# Patient Record
Sex: FEMALE
[De-identification: names, ages, dates, MRNs, and addresses within clinical notes are randomized per-mention and may not be internally consistent; named-entity substitution may affect disease eponyms.]

---

## 2018-09-20 ENCOUNTER — HOSPITAL ENCOUNTER (OUTPATIENT)
Dept: HOSPITAL 89 - SPU | Age: 28
LOS: 41 days | Discharge: HOME | End: 2018-10-31
Attending: INTERNAL MEDICINE
Payer: MEDICAID

## 2018-09-20 VITALS — DIASTOLIC BLOOD PRESSURE: 67 MMHG | SYSTOLIC BLOOD PRESSURE: 110 MMHG

## 2018-09-20 VITALS — WEIGHT: 167.11 LBS | BODY MASS INDEX: 28.29 KG/M2

## 2018-09-20 DIAGNOSIS — D69.6: Primary | ICD-10-CM

## 2018-09-20 DIAGNOSIS — R53.1: ICD-10-CM

## 2018-09-20 DIAGNOSIS — R53.83: ICD-10-CM

## 2018-09-20 DIAGNOSIS — Z87.891: ICD-10-CM

## 2018-09-20 LAB — PLATELET COUNT, AUTOMATED: 185 K/UL (ref 150–450)

## 2018-09-20 PROCEDURE — 36415 COLL VENOUS BLD VENIPUNCTURE: CPT

## 2018-09-20 PROCEDURE — 85025 COMPLETE CBC W/AUTO DIFF WBC: CPT

## 2018-09-20 PROCEDURE — 99212 OFFICE O/P EST SF 10 MIN: CPT

## 2018-09-20 NOTE — ONCOLOGY FOLLOW UP NOTE
EVENT DATE:  September 20, 2018 



DIAGNOSIS

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 

thrombocytopenic purpura.



CHIEF COMPLAINT

Patient is here today for followup of her thrombocytopenia with pregnancy.



HEMATOLOGY HISTORY

Patient is a 28-year-old female.  Patient moved from [place] to [place], [place].  She got pregnant, and [facility] 

have blood count on September 29, 2017, which showed normal white count of 5440,

normal hemoglobin at 14.6, and hematocrit at 41.7%, but her platelet count was 

84,000.  Repeat CBC for the platelets done on October 2, 2017, showed platelet 

count of 107,000.  Repeat CBC showed white count 6.8, hemoglobin 13.8, 

hematocrit 39, and the platelet count 161,000.  Haptoglobin was normal at 52, 

total bilirubin was normal at 1.2, LDH was normal at 405, ruling out the 

possibility of hemolytic anemia.  B12 level was normal at 470, and the 

methylmalonic acid assay was normal at 0.16.  Serum folate was more than 22.3, 

and red cell folate was normal at 1241.  Platelet-associated antibodies came 

back strongly positive for platelet antibodies direct IgG.



HISTORY OF PRESENT ILLNESS

The patient is here today for followup of her thrombocytopenia with pregnancy.  

She is doing fine currently except for occasional nasal bleeds, and she is also 

weak, tired, and fatigued since her delivery and taking care of the young 

children.  

 

PAST MEDICAL HISTORY

Insignificant except for premature uterine contractions with her last pregnancy 

9 months ago.



PAST SURGICAL HISTORY

1.  D and C for a miscarriage.

2.  [place] tooth extraction.



FAMILY HISTORY

Mother had breast cancer.  Maternal grandmother had stomach and lung cancer.



SOCIAL HISTORY

Patient is  with two biological children.  She is a homemaker currently. 

She quit working after she got pregnant.  She smoked for five years around half 

a pack a day, but she quit four years ago.  Denies any abuse of alcohol or 

illicit drugs.



CURRENT MEDICATIONS

1.  Prenatal vitamins.

2.  Folic acid.

3.  Fish oil.



ALLERGIES

No known drug allergies.



REVIEW OF SYSTEMS

CONSTITUTIONAL:  She has some chills, but because of the cold weather.  

HEENT:

Ears:  No tinnitus or hearing problem.

Nose:  Patient has occasional epistaxis.

Throat:  No sore throat or mouth ulcers.

Eyes:  No diplopia or visual changes.

RESPIRATORY:  She has cough and shortness of breath.

CARDIOVASCULAR:  No chest pain, orthopnea, or paroxysmal nocturnal dyspnea 

(PND).  No edema.  No palpitations.

GASTROINTESTINAL:  She has nausea.  No vomiting.  

GENITOURINARY:  No hematuria or dysuria.

MUSCULOSKELETAL:  She has bilateral pain in the hips.  No pain in the muscles, 

joints or bones.

NEUROLOGICAL:  She has occasional headache.

HEMATOLOGIC/LYMPHATIC:  She is weak, tired, and fatigued.  

SKIN:  No skin rash or lumps.

PSYCHIATRIC:  No anxiety or depression.



PHYSICAL EXAMINATION

GENERAL:  Looks stable.  Well developed, well nourished, and in no acute 

distress.

VITAL SIGNS:  Blood pressure 110/67, pulse 65 per minute, respirations 16 per 

minute, temperature 97.9, pulse ox 93% on room air.

HEENT:

Head:  Atraumatic.  No sinus tenderness to palpation.

Eyes:  No icterus or conjunctivitis.

Mouth and Throat:  No oral thrush or mucositis.

NECK:  Supple.  No cervical or supraclavicular lymphadenopathy.

LUNGS:  Clear to auscultation and percussion bilaterally.

HEART:  Regular rate and rhythm.  No gallops, murmurs, clicks, or rubs.

ABDOMEN:  Soft and lax.  No tenderness.  No hepatosplenomegaly.  No masses.

EXTREMITIES:  No cyanosis, clubbing, or edema.

LYMPHATICS:  No peripheral lymphadenopathy.

NEUROLOGICAL:  Conscious, alert, and oriented times three.  No focal motor or 

sensory deficits.

PSYCHIATRIC:  Mood and affect appear normal.

SKIN:  No skin rash, bruise, or purpuric eruption.



DIAGNOSTIC DATA

CBC showed white count 4.9, hemoglobin 14.4, hematocrit 42.5, platelets 185,000.

 



ASSESSMENT

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 

thrombocytopenic purpura given thrombocytopenia occurs during her late first 

trimester or the beginning of the second trimester.  Platelet-associated 

antibodies were strongly positive for platelet antibodies, direct IgG.  Vitamin 

B12, folate, red cell folate, methylmalonic acid assay all came within the 

normal range.  Her platelet count currently is 185,000, up from 168,000 her last

visit.  No hematological intervention is required at the moment.  I am planning 

to see her in a year with CBC, and I advised her to report to the office for CBC

checkup if she has excessive bruising or clinical bleeding.  



PLAN

1.  Continue followup.

2.  Patient to return in one year with CBC.

3.  The patient to contact us for any new concerns or complaints. 

NED